# Patient Record
Sex: MALE | Race: BLACK OR AFRICAN AMERICAN | Employment: UNEMPLOYED | URBAN - METROPOLITAN AREA
[De-identification: names, ages, dates, MRNs, and addresses within clinical notes are randomized per-mention and may not be internally consistent; named-entity substitution may affect disease eponyms.]

---

## 2018-01-16 ENCOUNTER — TELEPHONE (OUTPATIENT)
Dept: CARDIOTHORACIC SURGERY | Age: 23
End: 2018-01-16

## 2018-01-16 DIAGNOSIS — J93.9 PNEUMOTHORAX ON LEFT: Primary | ICD-10-CM

## 2018-01-16 DIAGNOSIS — S22.32XD CLOSED FRACTURE OF ONE RIB OF LEFT SIDE WITH ROUTINE HEALING, SUBSEQUENT ENCOUNTER: ICD-10-CM

## 2018-01-23 ENCOUNTER — TELEPHONE (OUTPATIENT)
Dept: CARDIOTHORACIC SURGERY | Age: 23
End: 2018-01-23

## 2018-01-25 ENCOUNTER — TELEPHONE (OUTPATIENT)
Dept: CARDIOTHORACIC SURGERY | Age: 23
End: 2018-01-25

## 2018-01-25 NOTE — TELEPHONE ENCOUNTER
Attempted to reach pt again today to resched missed appt from [facility] 1/22/18. No answer.   LMOR to call and sched appt.  Randolph Carrion

## 2018-02-01 ENCOUNTER — TELEPHONE (OUTPATIENT)
Dept: CARDIOTHORACIC SURGERY | Age: 23
End: 2018-02-01

## 2018-02-01 NOTE — TELEPHONE ENCOUNTER
[facility] for pt and on his emergency contact # listed on file. This is third attempt to reach pt. He needs a CXR and f/u appt related to rib fracture and pneumothorax.   Will attempt to reach via mail to address on file.  Matias Curtis

## 2018-02-05 ENCOUNTER — TELEPHONE (OUTPATIENT)
Dept: CARDIOTHORACIC SURGERY | Age: 23
End: 2018-02-05

## 2020-08-20 ENCOUNTER — HOSPITAL ENCOUNTER (EMERGENCY)
Age: 25
Discharge: HOME | End: 2020-08-20
Attending: EMERGENCY MEDICINE
Payer: MEDICARE

## 2020-08-20 ENCOUNTER — APPOINTMENT (OUTPATIENT)
Dept: GENERAL RADIOLOGY | Age: 25
End: 2020-08-20
Payer: MEDICARE

## 2020-08-20 VITALS
HEIGHT: 68 IN | OXYGEN SATURATION: 100 % | RESPIRATION RATE: 19 BRPM | DIASTOLIC BLOOD PRESSURE: 92 MMHG | TEMPERATURE: 97.9 F | BODY MASS INDEX: 18.71 KG/M2 | WEIGHT: 123.46 LBS | HEART RATE: 75 BPM | SYSTOLIC BLOOD PRESSURE: 135 MMHG

## 2020-08-20 PROCEDURE — 99284 EMERGENCY DEPT VISIT MOD MDM: CPT

## 2020-08-20 PROCEDURE — 6370000000 HC RX 637 (ALT 250 FOR IP): Performed by: EMERGENCY MEDICINE

## 2020-08-20 PROCEDURE — 71046 X-RAY EXAM CHEST 2 VIEWS: CPT

## 2020-08-20 RX ORDER — ACETAMINOPHEN 325 MG/1
650 TABLET ORAL ONCE
Status: COMPLETED | OUTPATIENT
Start: 2020-08-20 | End: 2020-08-20

## 2020-08-20 RX ORDER — IBUPROFEN 400 MG/1
800 TABLET ORAL ONCE
Status: COMPLETED | OUTPATIENT
Start: 2020-08-20 | End: 2020-08-20

## 2020-08-20 RX ORDER — NAPROXEN 500 MG/1
500 TABLET ORAL 2 TIMES DAILY PRN
Qty: 20 TABLET | Refills: 0 | Status: SHIPPED | OUTPATIENT
Start: 2020-08-20 | End: 2020-08-20 | Stop reason: SDUPTHER

## 2020-08-20 RX ORDER — NAPROXEN 500 MG/1
500 TABLET ORAL 2 TIMES DAILY PRN
Qty: 20 TABLET | Refills: 0 | Status: SHIPPED | OUTPATIENT
Start: 2020-08-20 | End: 2020-08-30

## 2020-08-20 RX ADMIN — IBUPROFEN 800 MG: 400 TABLET, FILM COATED ORAL at 13:44

## 2020-08-20 RX ADMIN — ACETAMINOPHEN 650 MG: 325 TABLET ORAL at 13:44

## 2020-08-20 ASSESSMENT — PAIN SCALES - GENERAL
PAINLEVEL_OUTOF10: 4
PAINLEVEL_OUTOF10: 4

## 2020-08-20 NOTE — ED PROVIDER NOTES
Emergency Physician Note  [address] [facility] [address]  Dept: [phone]  Loc: [phone]    Chief Complaint  Shortness of Breath (pt states that L lower rib he fractured 3 years ago will occasionally move and stick into his lung, making breathing difficult. pt states this happened for 25 minutes at 0830 today.)       History of Present Illness  Babak Pierre is a 22 y.o. male  has a past medical history of Seasonal allergies. who presents to the ED for left-sided rib pain. At 830 this morning patient was lying on his left side in bed when he had sudden onset of heart pain digging into his lung, he states it was making it difficult for him to breathe, pain was worse with deep breath, pain did not radiate. He states that he has had a broken rib in that area before and every once in a while it will \"pop\" out of place can cause pain but this time the pain lasted much longer than it normally does. At this time patient has some minimal achiness around that area but no longer complaining of shortness of breath. Denies hemoptysis, denies history of malignancy, denies any exogenous estrogen use, denies history of DVT/PE, denies unilateral lower extremity swelling, denies any surgery or significant trauma in the past 4 weeks. Denies fever, chills, malaise,   cough, abdominal pain, nausea, vomiting, diarrhea, headache,   back pain, rash. No palliative/provocative factors.        Unless otherwise stated in this report or unable to obtain because of the patient's clinical or mental status as evidenced by the medical record, this patient's positive and negative responses for review of systems, constitutional, psych, eyes, ENT, cardiovascular, respiratory, gastrointestinal, neurological, genitourinary, musculoskeletal, integument systems and systems related to the presenting problem are either stated in the preceding file    Stress: Not on file   Relationships    Social connections     Talks on phone: Not on file     Gets together: Not on file     Attends [employer] service: Not on file     Active member of club or organization: Not on file     Attends meetings of clubs or organizations: Not on file     Relationship status: Not on file    Intimate partner violence     Fear of current or ex partner: Not on file     Emotionally abused: Not on file     Physically abused: Not on file     Forced sexual activity: Not on file   Other Topics Concern    Not on file   Social History Narrative    Not on file       Nursing notes reviewed. ED Triage Vitals [08/20/20 1121]   Enc Vitals Group      BP (!) 135/92      Pulse 75      Resp 19      Temp 97.9 °F (36.6 °C)      Temp Source Infrared      SpO2 100 %      Weight 123 lb 7.3 oz (56 kg)      Height 5' 8\" (1.727 m)      Head Circumference       Peak Flow       Pain Score       Pain Loc       Pain Edu? Excl. in [address]? GENERAL:    Awake, alert. Well developed, thin with no apparent distress. Nontoxic-appearing, non-ill-appearing. HENT:    Normocephalic, Atraumatic, no lacerations. Oropharynx clear, no tonsilar inflammation, no tonsilar exudates,   no airway obstruction, moist mucous membranes. Uvula was midline and has symmetric rise. Multiple teeth coated in goals  EYES:    Conjunctiva normal,   Pupils equal round and reactive to light,   Extraocular movements normal.  NECK:    Trachea is midline. No stridor. CHEST:    Regular rate and regular rhythm, no murmurs/rubs/gallops,   normal S1/S2, chest wall non-tender, palpation over the area of pain that he indicated did not elicit any pain tenderness, do not feel any bony step-off in the ribs. LUNGS:    No respiratory distress. No abdominal retractions, no sternal retractions.    Clear to auscultation bilaterally, no wheezing, no rhochi, no rales  Speaking comfortably in full sentences  ABDOMEN:    Soft, non-tender, non-distended. No guarding. No rebound. EXTREMITIES:   Moves extremities x4 with purpose. Normal range of motion, no edema,   no tenderness, no deformity,   distal pulses present and equal bilaterally. BACK:    No midline tenderness in the cervical, thoracic, and lumbar spine. No deformities, no step-off. Palpation did not elicit any paraspinous tenderness. Mild scoliosis of the spine  SKIN:    Warm, dry and intact. NEUROLOGIC:  Normal mental status. Moving all extremities to command. Alert and oriented x4   without focal motor deficit or gross sensory deficit. Normal speech. PSYCHIATRIC:  Not anxious,   normal mood and affect,   thoughts are linear and organized,   without delusions/hallucinations,   responds appropriately to questions      LABS and DIAGNOSTIC RESULTS    RADIOLOGY  X-RAYS:  I have reviewed radiologic plain film image(s). ALL OTHER NON-PLAIN FILM IMAGES SUCH AS CT, ULTRASOUND AND MRI HAVE BEEN READ BY THE RADIOLOGIST. XR CHEST (2 VW)   Final Result   No acute process. Chest X-Ray  Interpreted by: Emergency Department Physician  View: PA/Lateral chest xray  Findings: Normal heart size, Normal lungs, Normal mediastinum, No hemothorax, No pneumothorax, No effusion    LABS  No results found for this visit on 08/20/20. PROCEDURES      MEDICAL DECISION MAKING    Procedures/interventions/images ordered for this visit  Orders Placed This Encounter   Procedures    XR CHEST (2 VW)       Medications ordered for this visit  Orders Placed This Encounter   Medications    ibuprofen (ADVIL;MOTRIN) tablet 800 mg    acetaminophen (TYLENOL) tablet 650 mg    naproxen (NAPROSYN) 500 MG tablet     Sig: Take 1 tablet by mouth 2 times daily as needed for Pain     Dispense:  20 tablet     Refill:  0       ED course notes for this visit       I wore goggles, surgical mask, N95 mask and gloves when I evaluated the patient.     I evaluated the patient in room QC 14/14    - Diagnostic studies reviewed and results discussed with patient  - We agreed to treat Pleurisy    Wells Criteria: To assess patient for likelihood of a pulmonary embolism. Physical findings suggestive of DVT (unilateral leg swelling, calf or thigh tenderness):+0 No  No alternative diagnosis better explains the illness:+0 No  Tachycardia with pulse > 100:+0 No  Immobilization (?3 days) or surgery in the previous four weeks:+0 No  Prior history of DVT or pulmonary embolism:+0 No  Presence of hemoptysis:+0 No  Presence of malignancy:+0 No    Pulmonary embolism risk score interpretation: 0. This falls under the following category: Score of < 2, which indicates a low probability    PERC Rule:  Applicable in this patient who has low clinical suspicion for pulmonary embolism. Age < 48years old: Yes  Heart rate < 100 bpm: Yes  Oxygen saturation > 95%: Yes  Hemoptysis: No  Exogenous estrogen use: No  Prior history of DVT or PE: No  Unilateral leg swelling: No  Surgery or significant trauma in the past 4 weeks: No    Based on the above, PE can effectively be ruled out without further testing. I completed a structured, evidence-based clinical evaluation to screen for pulmonary embolus in this patient. The evidence indicates that the patient is very low risk for pulmonary embolus, and this is consistent with my clinical intuition. The risk of further testing, imaging or hospitalization is likely higher than the risk of the patient having a pulmonary embolus. It is, therefore, in the best interest of the patient not to do additional emergent testing or be hospitalized. I have discussed with the patient my clinical impression and the result of an evidence-based clinical evaluation to screen for pulmonary embolus, as well as the risk of further testing and hospitalization. The evidence shows that the risk for pulmonary embolus is about 1% or less.  Although the risk of pulmonary has not been eliminated, the risks of further testing or hospitalization likely exceed the benefit, and the patient declines further emergent evaluation or hospitalization for pulmonary embolus. THORACIC AORTIC DISSECTION SCREENING (TADS):    Associated New Neuro Deficies?: +0  No  Radial/Femoral Pulses Feel Uneven?: +0  No  Pain Maximal at Onset or Abrupt?: +0  No  Pain severe, ripping, or tearing?: +0  No  Migrates:  Chest, back, or abdomen?: +0  No  Chest XR Normal?: +0  No  A normal chest x-ray is defined as 1. Normal mediastinum, and 2. No pleural effusion, and 3. no apical pleural (curb density of the lung apex)    TADS score: < 0. This falls under the following category: Score of 0, odds of aortic dissection is <  1/1000, no further workup needed regarding the aorta and supports discharge    I engaged in a shared decision making discussion with the patient about the risk and potential benefits of CT scanning and they concurred with the plan to proceed without a CT scan as the risk is deemed a outweigh any potential benefit at this time. I did not calculate a heart score for this patient as I have very low suspicion for cardiac origin of his transient pain    Differential Diagnosis: Acute bronchitis, Musculoskeletal chest pain, Pleurisy, Pulmonary edema, Congestive Heart Failure, Myocardial Infarction, Pulmonary Embolus, Thoracic Dissection, Pericarditis, Pericardial Effusion, Pneumonia, Pneumothorax, Boerhaave's syndrome, Ischemic Bowel, Bowel Obstruction, PUD, GERD, Acute Cholecystitis, Pancreatitis, Hepatitis, Colitis, other      This is a very pleasant patient with chest pain. On physical exam, patient does not have any abnormal heart or lung sounds.   The work up in the ED indicates patient is without significant evidence of acute coronary syndrome, pulmonary embolism, thoracic aortic dissection, pericarditis, pneumothorax, esophageal rupture, pneumonia, toxicity, shock, sepsis, unstable arrhythmia, hemodynamic or

## 2020-08-20 NOTE — ED NOTES
Walked pt from [address] [place] [address] to ED bed. Obtained VS. Pt and visitor wearing masks, medic wearing mask, gloves, safety glasses. Visitor temp 98.1, visitor negative for sob, fever, and cough.      Nubia Duffy, EMT-P  08/20/20 1124

## 2020-08-21 ENCOUNTER — CARE COORDINATION (OUTPATIENT)
Dept: CARE COORDINATION | Age: 25
End: 2020-08-21

## 2020-08-21 NOTE — CARE COORDINATION
ED Visit: Pleurisy or Musculoskeletal Pain  Pt denied any other symptoms or fever. Hx: of left sided rib fractures    New Medication:  naproxen (NAPROSYN) 500 MG tablet  20 tablet  0  2020     Sig - Route: Take 1 tablet by mouth 2 times daily as needed for Pain - Oral      Patient contacted regarding COVID-19 No exposure. Discussed COVID-19 related testing which was  at this time. Test results were not done . Patient informed of results, if available? Not done    Care Transition Nurse/ Ambulatory Care Manager contacted the patient by telephone to perform post discharge assessment. Verified name and  with patient as identifiers. Provided introduction to self, and explanation of the CTN/ACM role, and reason for call due to risk factors for infection and/or exposure to COVID-19. Symptoms reviewed with patient who verbalized the following symptoms: chest pain and rib pain. Due to no new or worsening symptoms encounter was not routed to provider for escalation. Discussed follow-up appointments. If no appointment was previously scheduled, appointment scheduling offered: Yes  [facility] follow up appointment(s): No future appointments. Advance Care Planning:   Does patient have an Advance Directive:  reviewed and current. Patient has following risk factors of: no known risk factors. CTN/ACM reviewed discharge instructions, medical action plan and red flags such as increased shortness of breath, increasing fever and signs of decompensation with patient who verbalized understanding. Discussed exposure protocols and quarantine with CDC Guidelines What to do if you are sick with coronavirus disease .  Patient was given an opportunity for questions and concerns. The patient agrees to contact the [facility] exposure line [phone], local health department  and PCP office for questions related to their healthcare. CTN/ACM provided contact information for future needs.     Reviewed and educated patient on any new and changed medications related to discharge diagnosis     Patient/family/caregiver given information for [facility] and agrees to enroll no    Pt agreed to a 14 day follow call from the RN, GARRETT.

## 2020-09-04 ENCOUNTER — CARE COORDINATION (OUTPATIENT)
Dept: CARE COORDINATION | Age: 25
End: 2020-09-04

## 2020-09-04 NOTE — CARE COORDINATION
Patient resolved from the Care Transitions episode on 9/04/2020  COVID-19 related testing  was not done at this time. ED Visit: Pleurisy or Musculoskeletal Pain  Pt denied any other symptoms or fever. Hx: of left sided rib fractures    Patient/family has been provided the following resources and education related to COVID-19 during prior encounter:                         Signs, symptoms and red flags related to COVID-19            CDC exposure and quarantine guidelines            [facility] exposure contact - [phone]            Contact for their local Department of Health               The RN, [address] [facility] called and left a message with the nurse's call back number asking the pt to return the nurse's call. As a resource only, due to the recent COVID-19 pandemic, [facility] ([facility]) has a [employer], [phone] for anyone that is experiencing respiratory problems, fever or Flu-like symptoms. No further outreach scheduled with this CTN/ACM. Episode of Care resolved. Patient has this CTN/ACM contact information if future needs arise.